# Patient Record
Sex: FEMALE | Race: WHITE | NOT HISPANIC OR LATINO | Employment: FULL TIME | URBAN - METROPOLITAN AREA
[De-identification: names, ages, dates, MRNs, and addresses within clinical notes are randomized per-mention and may not be internally consistent; named-entity substitution may affect disease eponyms.]

---

## 2017-07-04 ENCOUNTER — HOSPITAL ENCOUNTER (EMERGENCY)
Facility: HOSPITAL | Age: 37
Discharge: HOME | End: 2017-07-04
Attending: EMERGENCY MEDICINE

## 2017-07-04 VITALS
DIASTOLIC BLOOD PRESSURE: 69 MMHG | OXYGEN SATURATION: 100 % | TEMPERATURE: 100 F | BODY MASS INDEX: 29.86 KG/M2 | HEART RATE: 93 BPM | HEIGHT: 72 IN | RESPIRATION RATE: 20 BRPM | SYSTOLIC BLOOD PRESSURE: 134 MMHG | WEIGHT: 220.44 LBS

## 2017-07-04 DIAGNOSIS — R42 DIZZINESS: Primary | ICD-10-CM

## 2017-07-04 DIAGNOSIS — N10 PYELONEPHRITIS, ACUTE: ICD-10-CM

## 2017-07-04 LAB
ALBUMIN SERPL BCP-MCNC: 3.6 G/DL
ALP SERPL-CCNC: 76 U/L
ALT SERPL W/O P-5'-P-CCNC: 14 U/L
ANION GAP SERPL CALC-SCNC: 9 MMOL/L
AST SERPL-CCNC: 12 U/L
B-HCG UR QL: NEGATIVE
BACTERIA #/AREA URNS HPF: ABNORMAL /HPF
BASOPHILS # BLD AUTO: 0.02 K/UL
BASOPHILS NFR BLD: 0.2 %
BILIRUB SERPL-MCNC: 0.6 MG/DL
BILIRUB UR QL STRIP: NEGATIVE
BUN SERPL-MCNC: 12 MG/DL
CALCIUM SERPL-MCNC: 9.1 MG/DL
CHLORIDE SERPL-SCNC: 104 MMOL/L
CLARITY UR: ABNORMAL
CO2 SERPL-SCNC: 24 MMOL/L
COLOR UR: YELLOW
CREAT SERPL-MCNC: 1.1 MG/DL
CTP QC/QA: YES
DIFFERENTIAL METHOD: ABNORMAL
EOSINOPHIL # BLD AUTO: 0 K/UL
EOSINOPHIL NFR BLD: 0.2 %
ERYTHROCYTE [DISTWIDTH] IN BLOOD BY AUTOMATED COUNT: 12.8 %
EST. GFR  (AFRICAN AMERICAN): >60 ML/MIN/1.73 M^2
EST. GFR  (NON AFRICAN AMERICAN): >60 ML/MIN/1.73 M^2
GLUCOSE SERPL-MCNC: 93 MG/DL
GLUCOSE UR QL STRIP: NEGATIVE
HCT VFR BLD AUTO: 36 %
HGB BLD-MCNC: 12.2 G/DL
HGB UR QL STRIP: ABNORMAL
HYALINE CASTS #/AREA URNS LPF: 0 /LPF
KETONES UR QL STRIP: ABNORMAL
LEUKOCYTE ESTERASE UR QL STRIP: ABNORMAL
LIPASE SERPL-CCNC: 7 U/L
LYMPHOCYTES # BLD AUTO: 1.6 K/UL
LYMPHOCYTES NFR BLD: 12.6 %
MCH RBC QN AUTO: 30.4 PG
MCHC RBC AUTO-ENTMCNC: 33.9 %
MCV RBC AUTO: 90 FL
MICROSCOPIC COMMENT: ABNORMAL
MONOCYTES # BLD AUTO: 1 K/UL
MONOCYTES NFR BLD: 7.6 %
NEUTROPHILS # BLD AUTO: 10.1 K/UL
NEUTROPHILS NFR BLD: 79.2 %
NITRITE UR QL STRIP: POSITIVE
PH UR STRIP: 6 [PH] (ref 5–8)
PLATELET # BLD AUTO: 185 K/UL
PMV BLD AUTO: 10.3 FL
POTASSIUM SERPL-SCNC: 3.4 MMOL/L
PROT SERPL-MCNC: 8 G/DL
PROT UR QL STRIP: ABNORMAL
RBC # BLD AUTO: 4.01 M/UL
RBC #/AREA URNS HPF: 0 /HPF (ref 0–4)
SODIUM SERPL-SCNC: 137 MMOL/L
SP GR UR STRIP: 1.02 (ref 1–1.03)
SQUAMOUS #/AREA URNS HPF: 10 /HPF
URN SPEC COLLECT METH UR: ABNORMAL
UROBILINOGEN UR STRIP-ACNC: 1 EU/DL
WBC # BLD AUTO: 12.72 K/UL
WBC #/AREA URNS HPF: 100 /HPF (ref 0–5)
WBC CLUMPS URNS QL MICRO: ABNORMAL

## 2017-07-04 PROCEDURE — 63600175 PHARM REV CODE 636 W HCPCS: Performed by: PHYSICIAN ASSISTANT

## 2017-07-04 PROCEDURE — 87186 SC STD MICRODIL/AGAR DIL: CPT

## 2017-07-04 PROCEDURE — 83690 ASSAY OF LIPASE: CPT

## 2017-07-04 PROCEDURE — 99284 EMERGENCY DEPT VISIT MOD MDM: CPT | Mod: 25

## 2017-07-04 PROCEDURE — 87086 URINE CULTURE/COLONY COUNT: CPT

## 2017-07-04 PROCEDURE — 96375 TX/PRO/DX INJ NEW DRUG ADDON: CPT

## 2017-07-04 PROCEDURE — 87088 URINE BACTERIA CULTURE: CPT

## 2017-07-04 PROCEDURE — 25000003 PHARM REV CODE 250: Performed by: PHYSICIAN ASSISTANT

## 2017-07-04 PROCEDURE — 80053 COMPREHEN METABOLIC PANEL: CPT

## 2017-07-04 PROCEDURE — 87077 CULTURE AEROBIC IDENTIFY: CPT

## 2017-07-04 PROCEDURE — 81000 URINALYSIS NONAUTO W/SCOPE: CPT

## 2017-07-04 PROCEDURE — 81025 URINE PREGNANCY TEST: CPT

## 2017-07-04 PROCEDURE — 85025 COMPLETE CBC W/AUTO DIFF WBC: CPT

## 2017-07-04 PROCEDURE — 96365 THER/PROPH/DIAG IV INF INIT: CPT

## 2017-07-04 RX ORDER — IBUPROFEN 600 MG/1
600 TABLET ORAL
Status: COMPLETED | OUTPATIENT
Start: 2017-07-04 | End: 2017-07-04

## 2017-07-04 RX ORDER — AMOXICILLIN AND CLAVULANATE POTASSIUM 875; 125 MG/1; MG/1
1 TABLET, FILM COATED ORAL 2 TIMES DAILY
Qty: 14 TABLET | Refills: 0 | Status: SHIPPED | OUTPATIENT
Start: 2017-07-04

## 2017-07-04 RX ORDER — ONDANSETRON 2 MG/ML
4 INJECTION INTRAMUSCULAR; INTRAVENOUS
Status: COMPLETED | OUTPATIENT
Start: 2017-07-04 | End: 2017-07-04

## 2017-07-04 RX ORDER — MECLIZINE HYDROCHLORIDE 25 MG/1
25 TABLET ORAL 3 TIMES DAILY PRN
Qty: 20 TABLET | Refills: 0 | Status: SHIPPED | OUTPATIENT
Start: 2017-07-04

## 2017-07-04 RX ORDER — MECLIZINE HYDROCHLORIDE 25 MG/1
25 TABLET ORAL
Status: COMPLETED | OUTPATIENT
Start: 2017-07-04 | End: 2017-07-04

## 2017-07-04 RX ORDER — ONDANSETRON 4 MG/1
4 TABLET, ORALLY DISINTEGRATING ORAL EVERY 8 HOURS PRN
Qty: 15 TABLET | Refills: 0 | Status: SHIPPED | OUTPATIENT
Start: 2017-07-04

## 2017-07-04 RX ADMIN — IBUPROFEN 600 MG: 600 TABLET, FILM COATED ORAL at 06:07

## 2017-07-04 RX ADMIN — SODIUM CHLORIDE 1000 ML: 0.9 INJECTION, SOLUTION INTRAVENOUS at 04:07

## 2017-07-04 RX ADMIN — PROMETHAZINE HYDROCHLORIDE 12.5 MG: 25 INJECTION INTRAMUSCULAR; INTRAVENOUS at 04:07

## 2017-07-04 RX ADMIN — MECLIZINE HYDROCHLORIDE 25 MG: 25 TABLET ORAL at 05:07

## 2017-07-04 RX ADMIN — ONDANSETRON 4 MG: 2 INJECTION INTRAMUSCULAR; INTRAVENOUS at 04:07

## 2017-07-04 NOTE — ED PROVIDER NOTES
Encounter Date: 7/4/2017       History     Chief Complaint   Patient presents with    Fever     accompanied by nausea, vomiting, dizziness and left sided otalgia; seen at urgent care yesterday and was given nausea med, ibuprofen and bentyl with no relief; last dose of tylenol taken at one hour ago     Febrile 37-year-old female with past medical history of vertigo presents to the ED with complaints of fever, nausea, vomiting and diarrhea since Friday.  She reports multiple episodes of nonbloody emesis and watery believe this diarrhea.  She states that she went to urgent care yesterday and was found to be negative for influenza.  She was sent home with instructions [id number] tinea ibuprofen and Tylenol for fever, Zofran and Bentyl for GI symptoms.  She states no relief of symptoms with these medications.  She continues with fever and is unable to tolerate any thing by mouth.  Her last visit Tylenol was approximately 1 hour prior to arrival.  She has reported some dizziness and feels typical of her vertigo is exacerbated with positional changes and complains of left ear pain.  She did note some decreased urination and some pain with urination today.  She denies any other URI symptoms, cough, chest pain, shortness of breath, hematemesis, bloody diarrhea, weakness, arthralgias, myalgias or rash.  Recent medical history is significant significant for travel to [facility]; she denies any sick contacts or previous abdominal surgery.       The history is provided by the patient.     Review of patient's allergies indicates:  No Known Allergies  History reviewed. No pertinent past medical history.  History reviewed. No pertinent surgical history.  History reviewed. No pertinent family history.  Social History   Substance Use Topics    Smoking status: Never Smoker    Smokeless tobacco: Never Used    Alcohol use Yes     Review of Systems   Constitutional: Positive for appetite change, chills and fever.   HENT: Positive for  congestion and ear pain. Negative for facial swelling, sore throat and trouble swallowing.    Eyes: Negative for visual disturbance.   Respiratory: Negative for cough and shortness of breath.    Cardiovascular: Negative for chest pain.   Gastrointestinal: Positive for abdominal pain, diarrhea, nausea and vomiting.        Generalized abdominal cramping   Genitourinary: Positive for decreased urine volume and dysuria.   Musculoskeletal: Positive for arthralgias. Negative for back pain, gait problem, neck pain and neck stiffness.   Skin: Negative for rash.   Neurological: Positive for dizziness. Negative for weakness, numbness and headaches.        Room spinning   Hematological: Does not bruise/bleed easily.   Psychiatric/Behavioral: Negative for confusion.       Physical Exam     Initial Vitals [07/04/17 1521]   BP Pulse Resp Temp SpO2   137/64 97 20 (!) 101.3 °F (38.5 °C) 100 %      MAP       88.33         Physical Exam    Nursing note and vitals reviewed.  Constitutional: She appears well-developed and well-nourished. She is cooperative.  Non-toxic appearance. She appears ill. She appears distressed.   HENT:   Head: Normocephalic and atraumatic.   Right Ear: Tympanic membrane is not erythematous.   Left Ear: Tympanic membrane is not erythematous.   Ears:    Nose: Nose normal.   Mouth/Throat: Oropharynx is clear and moist. Mucous membranes are dry.   Eyes: Conjunctivae and lids are normal.   Neck: Neck supple. No neck rigidity.   Cardiovascular: Normal rate and regular rhythm.   Pulmonary/Chest: Breath sounds normal. No respiratory distress. She has no wheezes. She has no rhonchi.   Abdominal: Soft. Normal appearance and bowel sounds are normal. There is tenderness in the left lower quadrant. There is no rigidity, no rebound, no guarding and no CVA tenderness.   Musculoskeletal: Normal range of motion.   Neurological: She is alert and oriented to person, place, and time. She has normal strength. GCS eye subscore is  4. GCS verbal subscore is 5. GCS motor subscore is 6.   Skin: Skin is warm, dry and intact. No rash noted.   Psychiatric: She has a normal mood and affect. Her speech is normal and behavior is normal. Thought content normal.         ED Course   Procedures  Labs Reviewed   CBC W/ AUTO DIFFERENTIAL   COMPREHENSIVE METABOLIC PANEL   LIPASE   URINALYSIS             Medical Decision Making:   Initial Assessment:   Febrile 37-year-old female with past medical history of vertigo presents to the ED with complaints of fever, nausea, vomiting and diarrhea since Friday.  She reports multiple episodes of nonbloody emesis and watery believe this diarrhea.  She states that she went to urgent care yesterday and was found to be negative for influenza.  She was sent home with instructions [id number] tinea ibuprofen and Tylenol for fever, Zofran and Bentyl for GI symptoms.  She states no relief of symptoms with these medications.  She continues with fever and is unable to tolerate any thing by mouth.  Her last visit Tylenol was approximately 1 hour prior to arrival.  She has reported some dizziness and feels typical of her vertigo is exacerbated with positional changes and complains of left ear pain.  She did note some decreased urination and some pain with urination today.  She denies any other URI symptoms, cough, chest pain, shortness of breath, hematemesis, bloody diarrhea, weakness, arthralgias, myalgias or rash.  Recent medical history is significant significant for travel to [facility]; she denies any sick contacts or previous abdominal surgery.  Physical exam reveals patient appears ill however nontoxic and able eat.  With a smooth steady gait.  Right canal with cerumen impaction left canal clear left TM clear mild TTP of the left mastoid with no edema.  Nose clear, oropharynx clear, mucous membranes dry, free of pallor.  Neck is supple with no meningeal signs.  Lungs clear to auscultation; heart regular rate and rhythm.   Abdomen is soft with some mild TTP of the left lower quadrant with no rebound, rigidity or guarding; no CVA tenderness.  Normal bowel sounds.  Range of motion of all extremities with strength equal bilaterally.  Skin free of rash, pallor or diaphoresis; minor flushing of the face noted  Differential Diagnosis:   Gastroenteritis, traveler's diarrhea, enteritis, UTI, and mild dehydration, acute kidney injury, electrolyte abnormality, vertigo, otitis media, otitis externa      Clinical Tests:   Lab Tests: Ordered and Reviewed  Medical Tests: Ordered and Reviewed  ED Management:  Labs and cystoscopy with mild leukocytosis, mild hypokalemia and UA revealing nitrite positive UTI.  Symptoms improved in the ED with IV fluids, Phenergan and Antivert.  Patient smiling and laughing upon reevaluation stating she is ready to go home.  She'll be sent home with Augmentin with urine culture pending.  Instructed patient on diet for nausea vomiting and diarrhea and that she should follow up with primary this week. Patient was cautioned on when to return to ED. Patient verbalized understanding and agreement with the treatment plan                     ED Course     Clinical Impression:   The primary encounter diagnosis was Dizziness. A diagnosis of Pyelonephritis, acute was also pertinent to this visit.                           PAL Morrison  07/04/17 [id number]

## 2017-07-07 LAB — BACTERIA UR CULT: NORMAL

## 2023-03-31 NOTE — ED NOTES
Pt here c/o nausea with lower abd pain and vomiting and fever. No relief from home meds. Pt is alert and oriented with clear speech. No edema. Breath sounds are clear. Pt dnenies history of HTN or DM.  
no